# Patient Record
Sex: FEMALE
[De-identification: names, ages, dates, MRNs, and addresses within clinical notes are randomized per-mention and may not be internally consistent; named-entity substitution may affect disease eponyms.]

---

## 2018-12-03 ENCOUNTER — HOSPITAL ENCOUNTER (OUTPATIENT)
Dept: HOSPITAL 92 - BICRAD | Age: 28
Discharge: HOME | End: 2018-12-03
Attending: INTERNAL MEDICINE
Payer: COMMERCIAL

## 2018-12-03 DIAGNOSIS — M54.2: Primary | ICD-10-CM

## 2018-12-03 PROCEDURE — 72052 X-RAY EXAM NECK SPINE 6/>VWS: CPT

## 2018-12-03 NOTE — RAD
SEVEN VIEWS OF THE CERVICAL SPINE:

 

COMPARISON: 

None.

 

HISTORY:  

Cervicalgia.  Low neck pain for 2 months.

 

FINDINGS: 

AP, lateral, open-mouth odontoid, oblique, and flexion/extension views of the cervical spine were per
formed.  The vertebral bodies and intervertebral disks demonstrate normal height and alignment withou
t fracture or subluxation.  No degenerative changes are seen.  No prevertebral soft tissue swelling i
s seen.

 

IMPRESSION: 

Unremarkable exam.

 

POS: [facility]

## 2019-04-10 ENCOUNTER — HOSPITAL ENCOUNTER (EMERGENCY)
Dept: HOSPITAL 92 - ERS | Age: 29
Discharge: HOME | End: 2019-04-10
Payer: COMMERCIAL

## 2019-04-10 DIAGNOSIS — J30.2: Primary | ICD-10-CM

## 2019-04-10 PROCEDURE — 99283 EMERGENCY DEPT VISIT LOW MDM: CPT

## 2019-04-10 PROCEDURE — 87081 CULTURE SCREEN ONLY: CPT

## 2019-04-10 PROCEDURE — 87430 STREP A AG IA: CPT

## 2019-04-10 PROCEDURE — 87804 INFLUENZA ASSAY W/OPTIC: CPT

## 2019-07-18 ENCOUNTER — HOSPITAL ENCOUNTER (OUTPATIENT)
Dept: HOSPITAL 92 - BICULT | Age: 29
Discharge: HOME | End: 2019-07-18
Attending: FAMILY MEDICINE
Payer: COMMERCIAL

## 2019-07-18 DIAGNOSIS — N92.6: Primary | ICD-10-CM

## 2019-07-18 PROCEDURE — 76856 US EXAM PELVIC COMPLETE: CPT

## 2019-07-18 NOTE — ULT
TRANSABDOMINAL AND TRANSVAGINAL PELVIC ULTRASOUND WITH GRAY SCALE AND COLOR FLOW AND SPECTRAL DOPPLER
 IMAGING:

 

Date:  07/18/19 

 

HISTORY:  

Irregular menses

 

FINDINGS:

The uterus measures 8.6 x 4.7 x 4.0 cm without focal mass or endometrial fluid. The endometrium measu
res 5 mm in thickness. There are nabothian cysts in the cervix. There is a tiny echogenic focus in th
e cervix of unknown etiology. 

 

The right ovary measures 2.4 x 1.3 x 1.2 cm. The left ovary measures 2.2 x 1.7 x 1.3 cm. Flow is demo
nstrated to both ovaries. No adnexal mass or free fluid in the cul-de-sac seen. 

 

IMPRESSION: 

No significant abnormalities are identified. 

 

 

POS: [facility]